# Patient Record
Sex: FEMALE | Race: WHITE | Employment: OTHER | URBAN - METROPOLITAN AREA
[De-identification: names, ages, dates, MRNs, and addresses within clinical notes are randomized per-mention and may not be internally consistent; named-entity substitution may affect disease eponyms.]

---

## 2017-02-07 ENCOUNTER — HOSPITAL ENCOUNTER (OUTPATIENT)
Dept: MAMMOGRAPHY | Age: 57
Discharge: HOME | End: 2017-02-07
Attending: INTERNAL MEDICINE
Payer: COMMERCIAL

## 2017-02-07 DIAGNOSIS — Z12.31 VISIT FOR SCREENING MAMMOGRAM: ICD-10-CM

## 2017-02-07 PROCEDURE — 77067 SCR MAMMO BI INCL CAD: CPT

## 2017-05-09 ENCOUNTER — TELEPHONE (OUTPATIENT)
Dept: OBGYN CLINIC | Facility: CLINIC | Age: 57
End: 2017-05-09

## 2017-05-09 ENCOUNTER — OFFICE VISIT (OUTPATIENT)
Dept: OBGYN CLINIC | Facility: CLINIC | Age: 57
End: 2017-05-09

## 2017-05-09 VITALS
DIASTOLIC BLOOD PRESSURE: 76 MMHG | SYSTOLIC BLOOD PRESSURE: 113 MMHG | HEIGHT: 65 IN | BODY MASS INDEX: 23.99 KG/M2 | WEIGHT: 144 LBS

## 2017-05-09 DIAGNOSIS — N95.0 POSTMENOPAUSAL BLEEDING: Primary | ICD-10-CM

## 2017-05-09 PROCEDURE — 99203 OFFICE O/P NEW LOW 30 MIN: CPT | Performed by: OBSTETRICS & GYNECOLOGY

## 2017-05-09 RX ORDER — ALPRAZOLAM 0.25 MG/1
TABLET ORAL
COMMUNITY
Start: 2016-06-27

## 2017-05-09 RX ORDER — ESCITALOPRAM OXALATE 10 MG/1
TABLET ORAL
Refills: 3 | COMMUNITY
Start: 2017-04-20

## 2017-05-09 RX ORDER — ATORVASTATIN CALCIUM 10 MG/1
TABLET, FILM COATED ORAL
Refills: 0 | COMMUNITY
Start: 2017-04-20

## 2017-05-09 NOTE — TELEPHONE ENCOUNTER
Dr. Bernarda Cooper requesting Centralized scheduling be called by an RN in the office to schedule pt's pelvic U/S. Centralized scheduling closed at this time.  Message left on Centralized scheduling's voicemail to call a RN at this office in order to schedule U/S

## 2017-05-09 NOTE — PROGRESS NOTES
HPI:   Burnetta Heimlich is a 56year old female who presents for a consult for postmenopausal bleeding for 7 days last week, none currently. Pt counseled on possible etiologies/possible workup/possible treatments with postmenopausal bleeding.         Wt Readi possible hysteroscopy/ d & c if lesion noted on u/s. Well woman counseling. Pt in exam room/seen and counseled for approx 20-25 min.

## 2017-05-10 ENCOUNTER — HOSPITAL ENCOUNTER (OUTPATIENT)
Dept: ULTRASOUND IMAGING | Age: 57
Discharge: HOME | End: 2017-05-10
Attending: OBSTETRICS & GYNECOLOGY
Payer: COMMERCIAL

## 2017-05-10 DIAGNOSIS — N95.0 POSTMENOPAUSAL BLEEDING: ICD-10-CM

## 2017-05-10 PROCEDURE — 76856 US EXAM PELVIC COMPLETE: CPT | Performed by: OBSTETRICS & GYNECOLOGY

## 2017-05-10 PROCEDURE — 76830 TRANSVAGINAL US NON-OB: CPT | Performed by: OBSTETRICS & GYNECOLOGY

## 2017-05-11 ENCOUNTER — TELEPHONE (OUTPATIENT)
Dept: OBGYN CLINIC | Facility: CLINIC | Age: 57
End: 2017-05-11

## 2017-05-12 NOTE — TELEPHONE ENCOUNTER
Pt counseled on prob submucosal fibroid,  rec hysterscopy/myosure myomectomy/d  & c;   Diagnosis postmenopausal bleeding;submucosal fibroid.  Pt requested week of may 21, 2017

## 2017-05-12 NOTE — TELEPHONE ENCOUNTER
Called pt,left message. Pt has submucosal fibroid,  Would recommend a Operative Hysteroscopy/ Myosure/d & c.   Please let me know when pt calls back.

## 2017-05-12 NOTE — TELEPHONE ENCOUNTER
Pt called and was informed that, per Dr Ryan Kaminski , she has a submucosal fibroid and that he recommends she have a operative hysteroscopy/Myosure/and D+C. Pt at work, but will have her cell phone on her all day. Requesting Dr Ryan Kaminski call and discuss procedure with her.

## 2017-05-15 NOTE — TELEPHONE ENCOUNTER
Pt returning call also calling in [facility] to some other results she would like faxed over to [phone] ( ultrasound results)

## 2017-05-15 NOTE — TELEPHONE ENCOUNTER
Patient is scheduled for [facility] May 24th @ 10:30. Form faxed to surgery and entered in the book. Informed Gabriela Fitzgerald at surgery scheduling to call a rep for the Myosure. She agreed. Called patient to inform of details, no answer. LVM to call us back-need to give her codes of surgery for coverage (PPO).

## 2017-05-15 NOTE — TELEPHONE ENCOUNTER
Spoke to patient and informed of surgery date and time (2 hours prior). She Understood and verbalized agreement  Also, informed her to contact her [employer]-PP[employer] to check coverage of procedure; [id number]. She wanted those codes sent to her my chart. Sent her an e-mail to sign up for my chart to receive communications. Printed a minor case letter for now, and mailed it out today with surgery details. She wanted a copy of her U/S as well. I transferred her to [facility] to release those to her.

## 2017-05-16 NOTE — TELEPHONE ENCOUNTER
Updated book and called surgery scheduling to inform of surgery cancellation. Patient was seeking a second opinion.

## 2018-09-15 ENCOUNTER — APPOINTMENT (OUTPATIENT)
Dept: GENERAL RADIOLOGY | Facility: HOSPITAL | Age: 58
End: 2018-09-15
Attending: EMERGENCY MEDICINE
Payer: COMMERCIAL

## 2018-09-15 ENCOUNTER — HOSPITAL ENCOUNTER (OUTPATIENT)
Facility: HOSPITAL | Age: 58
Setting detail: OBSERVATION
Discharge: HOME | End: 2018-09-16
Attending: EMERGENCY MEDICINE | Admitting: HOSPITALIST
Payer: COMMERCIAL

## 2018-09-15 DIAGNOSIS — R07.9 CHEST PAIN, CARDIAC: Primary | ICD-10-CM

## 2018-09-15 LAB
ALBUMIN SERPL-MCNC: 3.7 G/DL (ref 3.5–4.8)
ALBUMIN/GLOB SERPL: 1.2 {RATIO} (ref 1–2)
ALP LIVER SERPL-CCNC: 60 U/L (ref 46–118)
ALT SERPL-CCNC: 31 U/L (ref 14–54)
ANION GAP SERPL CALC-SCNC: 8 MMOL/L (ref 0–18)
AST SERPL-CCNC: 46 U/L (ref 15–41)
BASOPHILS # BLD AUTO: 0.02 X10(3) UL (ref 0–0.1)
BASOPHILS NFR BLD AUTO: 0.3 %
BILIRUB SERPL-MCNC: 0.6 MG/DL (ref 0.1–2)
BUN BLD-MCNC: 18 MG/DL (ref 8–20)
BUN/CREAT SERPL: 15.3 (ref 10–20)
CALCIUM BLD-MCNC: 8.8 MG/DL (ref 8.3–10.3)
CHLORIDE SERPL-SCNC: 105 MMOL/L (ref 101–111)
CO2 SERPL-SCNC: 24 MMOL/L (ref 22–32)
CREAT BLD-MCNC: 1.18 MG/DL (ref 0.55–1.02)
D-DIMER: 0.27 UG/ML FEU (ref 0–0.49)
EOSINOPHIL # BLD AUTO: 0.02 X10(3) UL (ref 0–0.3)
EOSINOPHIL NFR BLD AUTO: 0.3 %
ERYTHROCYTE [DISTWIDTH] IN BLOOD BY AUTOMATED COUNT: 13.3 % (ref 11.5–16)
GLOBULIN PLAS-MCNC: 3.2 G/DL (ref 2.5–4)
GLUCOSE BLD-MCNC: 105 MG/DL (ref 70–99)
HCT VFR BLD AUTO: 37.9 % (ref 34–50)
HGB BLD-MCNC: 12.5 G/DL (ref 12–16)
IMMATURE GRANULOCYTE COUNT: 0.01 X10(3) UL (ref 0–1)
IMMATURE GRANULOCYTE RATIO %: 0.1 %
LYMPHOCYTES # BLD AUTO: 1.64 X10(3) UL (ref 0.9–4)
LYMPHOCYTES NFR BLD AUTO: 22.8 %
M PROTEIN MFR SERPL ELPH: 6.9 G/DL (ref 6.1–8.3)
MCH RBC QN AUTO: 30.2 PG (ref 27–33.2)
MCHC RBC AUTO-ENTMCNC: 33 G/DL (ref 31–37)
MCV RBC AUTO: 91.5 FL (ref 81–100)
MONOCYTES # BLD AUTO: 0.38 X10(3) UL (ref 0.1–1)
MONOCYTES NFR BLD AUTO: 5.3 %
NEUTROPHIL ABS PRELIM: 5.12 X10 (3) UL (ref 1.3–6.7)
NEUTROPHILS # BLD AUTO: 5.12 X10(3) UL (ref 1.3–6.7)
NEUTROPHILS NFR BLD AUTO: 71.2 %
OSMOLALITY SERPL CALC.SUM OF ELEC: 286 MOSM/KG (ref 275–295)
PLATELET # BLD AUTO: 207 10(3)UL (ref 150–450)
POTASSIUM SERPL-SCNC: 5.3 MMOL/L (ref 3.6–5.1)
RBC # BLD AUTO: 4.14 X10(6)UL (ref 3.8–5.1)
RED CELL DISTRIBUTION WIDTH-SD: 44.8 FL (ref 35.1–46.3)
SODIUM SERPL-SCNC: 137 MMOL/L (ref 136–144)
TROPONIN I SERPL-MCNC: <0.046 NG/ML (ref ?–0.05)
TROPONIN I SERPL-MCNC: <0.046 NG/ML (ref ?–0.05)
WBC # BLD AUTO: 7.2 X10(3) UL (ref 4–13)

## 2018-09-15 PROCEDURE — 99220 INITIAL OBSERVATION CARE,LEVL III: CPT | Performed by: INTERNAL MEDICINE

## 2018-09-15 PROCEDURE — 71045 X-RAY EXAM CHEST 1 VIEW: CPT | Performed by: EMERGENCY MEDICINE

## 2018-09-15 RX ORDER — HEPARIN SODIUM 5000 [USP'U]/ML
5000 INJECTION, SOLUTION INTRAVENOUS; SUBCUTANEOUS EVERY 8 HOURS SCHEDULED
Status: DISCONTINUED | OUTPATIENT
Start: 2018-09-15 | End: 2018-09-16

## 2018-09-15 RX ORDER — ASPIRIN 325 MG
325 TABLET ORAL DAILY
Status: DISCONTINUED | OUTPATIENT
Start: 2018-09-16 | End: 2018-09-16

## 2018-09-15 RX ORDER — NITROGLYCERIN 0.4 MG/1
0.4 TABLET SUBLINGUAL EVERY 5 MIN PRN
Status: DISCONTINUED | OUTPATIENT
Start: 2018-09-15 | End: 2018-09-16

## 2018-09-15 RX ORDER — ATORVASTATIN CALCIUM 10 MG/1
10 TABLET, FILM COATED ORAL NIGHTLY
Status: DISCONTINUED | OUTPATIENT
Start: 2018-09-15 | End: 2018-09-16

## 2018-09-15 RX ORDER — ALPRAZOLAM 0.25 MG/1
0.25 TABLET ORAL 2 TIMES DAILY PRN
Status: DISCONTINUED | OUTPATIENT
Start: 2018-09-15 | End: 2018-09-16

## 2018-09-15 RX ORDER — ASPIRIN 81 MG/1
324 TABLET, CHEWABLE ORAL ONCE
Status: COMPLETED | OUTPATIENT
Start: 2018-09-15 | End: 2018-09-15

## 2018-09-15 RX ORDER — ONDANSETRON 2 MG/ML
4 INJECTION INTRAMUSCULAR; INTRAVENOUS EVERY 6 HOURS PRN
Status: DISCONTINUED | OUTPATIENT
Start: 2018-09-15 | End: 2018-09-16

## 2018-09-15 RX ORDER — METOCLOPRAMIDE HYDROCHLORIDE 5 MG/ML
10 INJECTION INTRAMUSCULAR; INTRAVENOUS EVERY 8 HOURS PRN
Status: DISCONTINUED | OUTPATIENT
Start: 2018-09-15 | End: 2018-09-16

## 2018-09-15 RX ORDER — ESCITALOPRAM OXALATE 10 MG/1
10 TABLET ORAL EVERY MORNING
Status: DISCONTINUED | OUTPATIENT
Start: 2018-09-16 | End: 2018-09-16

## 2018-09-15 RX ORDER — ACETAMINOPHEN 325 MG/1
650 TABLET ORAL EVERY 6 HOURS PRN
Status: DISCONTINUED | OUTPATIENT
Start: 2018-09-15 | End: 2018-09-16

## 2018-09-15 NOTE — ED INITIAL ASSESSMENT (HPI)
Pt stated she was biking 25 miles today and while riding started to get chest pain. Pt stopped bike riding and the pain went away within about 5 minutes. Pt c/o tingling in left arm. Pt denies any chest pain at this moment.  Pt states its hard to take a agapito

## 2018-09-15 NOTE — ED PROVIDER NOTES
Patient Seen in: [facility] Emergency Department    History   Patient presents with:  Chest Pain Angina (cardiovascular)    Stated Complaint: chest pain    HPI    Patient is a 82-year-old female comes into emergency room for evaluation of chest tigh Resp 13   Ht 165.1 cm (5' 5\")   Wt 65.2 kg   SpO2 100%   BMI 23.92 kg/m²         Physical Exam    GENERAL: No acute distress, well appearing and non-toxic, Alert and oriented X 3   HEENT: Normocephalic, atraumatic. Moist mucous membranes.   Pupils equal CBC W/ DIFFERENTIAL[[id number]]                              Final result                 Please view results for these tests on the individual orders.    TROPONIN I   RAINBOW DRAW BLUE   RAINBOW DRAW LAVENDER   RAINBOW DRAW LIGHT GREEN   RAINBOW DRAW GOL cardiac  (primary encounter diagnosis)    Disposition:  Admit  9/15/2018  6:19 pm    Follow-up:  No follow-up provider specified.       Medications Prescribed:  Current Discharge Medication List        Present on Admission           ICD-10-CM Noted POA    *

## 2018-09-16 ENCOUNTER — PRIOR ORIGINAL RECORDS (OUTPATIENT)
Dept: OTHER | Age: 58
End: 2018-09-16

## 2018-09-16 ENCOUNTER — APPOINTMENT (OUTPATIENT)
Dept: CV DIAGNOSTICS | Facility: HOSPITAL | Age: 58
End: 2018-09-16
Attending: INTERNAL MEDICINE
Payer: COMMERCIAL

## 2018-09-16 VITALS
HEIGHT: 65 IN | TEMPERATURE: 98 F | SYSTOLIC BLOOD PRESSURE: 101 MMHG | HEART RATE: 70 BPM | OXYGEN SATURATION: 98 % | WEIGHT: 143.75 LBS | DIASTOLIC BLOOD PRESSURE: 53 MMHG | RESPIRATION RATE: 18 BRPM | BODY MASS INDEX: 23.95 KG/M2

## 2018-09-16 LAB
ANION GAP SERPL CALC-SCNC: 6 MMOL/L (ref 0–18)
ATRIAL RATE: 61 BPM
ATRIAL RATE: 84 BPM
BUN BLD-MCNC: 15 MG/DL (ref 8–20)
BUN/CREAT SERPL: 17.4 (ref 10–20)
CALCIUM BLD-MCNC: 9 MG/DL (ref 8.3–10.3)
CHLORIDE SERPL-SCNC: 107 MMOL/L (ref 101–111)
CHOLEST SMN-MCNC: 217 MG/DL (ref ?–200)
CO2 SERPL-SCNC: 29 MMOL/L (ref 22–32)
CREAT BLD-MCNC: 0.86 MG/DL (ref 0.55–1.02)
EST. AVERAGE GLUCOSE BLD GHB EST-MCNC: 111 MG/DL (ref 68–126)
GLUCOSE BLD-MCNC: 88 MG/DL (ref 70–99)
HBA1C MFR BLD HPLC: 5.5 % (ref ?–5.7)
HDLC SERPL-MCNC: 89 MG/DL (ref 40–59)
LDLC SERPL CALC-MCNC: 110 MG/DL (ref ?–100)
NONHDLC SERPL-MCNC: 128 MG/DL (ref ?–130)
OSMOLALITY SERPL CALC.SUM OF ELEC: 294 MOSM/KG (ref 275–295)
P AXIS: 61 DEGREES
P AXIS: 68 DEGREES
P-R INTERVAL: 158 MS
P-R INTERVAL: 164 MS
POTASSIUM SERPL-SCNC: 4.1 MMOL/L (ref 3.6–5.1)
Q-T INTERVAL: 370 MS
Q-T INTERVAL: 396 MS
QRS DURATION: 88 MS
QRS DURATION: 90 MS
QTC CALCULATION (BEZET): 398 MS
QTC CALCULATION (BEZET): 437 MS
R AXIS: 20 DEGREES
R AXIS: 4 DEGREES
SODIUM SERPL-SCNC: 142 MMOL/L (ref 136–144)
T AXIS: 34 DEGREES
T AXIS: 39 DEGREES
TRIGL SERPL-MCNC: 91 MG/DL (ref 30–149)
TROPONIN I SERPL-MCNC: <0.046 NG/ML (ref ?–0.05)
VENTRICULAR RATE: 61 BPM
VENTRICULAR RATE: 84 BPM
VLDLC SERPL CALC-MCNC: 18 MG/DL (ref 0–30)

## 2018-09-16 PROCEDURE — 99217 OBSERVATION CARE DISCHARGE: CPT | Performed by: STUDENT IN AN ORGANIZED HEALTH CARE EDUCATION/TRAINING PROGRAM

## 2018-09-16 PROCEDURE — 93306 TTE W/DOPPLER COMPLETE: CPT | Performed by: INTERNAL MEDICINE

## 2018-09-16 RX ORDER — ASPIRIN 81 MG/1
81 TABLET ORAL DAILY
Qty: 30 TABLET | Refills: 0 | Status: SHIPPED | COMMUNITY
Start: 2018-09-16

## 2018-09-16 NOTE — DISCHARGE SUMMARY
[facility] HOSPITAL[facility]  DISCHARGE SUMMARY     Melissa Sprague Patient Status:  Observation    1960 MRN [id number]   [facility] 8NE-A Attending Sarah Anderson MD   Hosp Day # 0 PCP Rubén      Date of Admission: 9/15/2018  Date of D CONTINUE taking these medications      Instructions Prescription details   ALPRAZolam 0.25 MG Tabs  Commonly known as:  XANAX       Refills:  0     atorvastatin 10 MG Tabs  Commonly known as:  LIPITOR      TK 1 T PO QD   Refills:  0     D 5000 5000 units

## 2018-09-16 NOTE — H&P
[facility] HOSPITALIST  History and Physical     Bro[place] Shade Patient Status:  Observation    1960 MRN [id number]   [facility] 8NE-A Attending Gay Heredia,    Hosp Day # 0 PCP Thania Abdalla     Chief Complaint: chest pain    His bruits. Respiratory: Clear to auscultation bilaterally. No wheezes. No rhonchi. Cardiovascular: S1, S2. Regular rate and rhythm. No murmurs, rubs or gallops. Equal pulses. Chest and Back: No tenderness or deformity.   Abdomen: Soft, nontender, nondisten

## 2018-09-16 NOTE — PLAN OF CARE
CARDIOVASCULAR - ADULT    • Maintains optimal cardiac output and hemodynamic stability Progressing    • Absence of cardiac arrhythmias or at baseline Progressing            Pt alert able to make needs known   Denies chest pain   Pt scheduled for discharge

## 2018-09-16 NOTE — PROGRESS NOTES
NURSING DISCHARGE NOTE    Discharged to home  via private car  Accompanied by spouse  Belongings taken with. Pt discharged to home. Discharge instructions reviewed with pt and spouse verbalized understanding. Script send with.  IV catheter lula

## 2018-09-16 NOTE — CONSULTS
[facility]  Cardiology Consultation    Jaleel Zhou Patient Status:  Emergency    1960 MRN [id number]   Location [address] [facility] Attending Aramis Mcknight MD   Hosp Day # 0 PCP Walters Daily     Reason for Consultat murmur, pericardial rub, S3.  Lungs: Clear without wheezes, rales, rhonchi or dullness. Normal excursions and effort. Abdomen: Soft, non-tender. Extremities: Without clubbing, cyanosis or edema. Peripheral pulses are 2+.   Neurologic: Alert and oriente

## 2018-09-17 ENCOUNTER — PRIOR ORIGINAL RECORDS (OUTPATIENT)
Dept: OTHER | Age: 58
End: 2018-09-17

## 2018-09-17 RX ORDER — ALPRAZOLAM 0.25 MG/1
0.25 TABLET ORAL
COMMUNITY

## 2018-09-18 ENCOUNTER — HOSPITAL ENCOUNTER (OUTPATIENT)
Dept: CT IMAGING | Facility: HOSPITAL | Age: 58
Discharge: HOME | End: 2018-09-18
Attending: INTERNAL MEDICINE
Payer: COMMERCIAL

## 2018-09-18 VITALS — SYSTOLIC BLOOD PRESSURE: 99 MMHG | DIASTOLIC BLOOD PRESSURE: 69 MMHG | HEART RATE: 60 BPM | RESPIRATION RATE: 13 BRPM

## 2018-09-18 DIAGNOSIS — R07.9 CHEST PAIN, UNSPECIFIED: ICD-10-CM

## 2018-09-18 PROCEDURE — 75574 CT ANGIO HRT W/3D IMAGE: CPT | Performed by: INTERNAL MEDICINE

## 2018-09-18 RX ORDER — DILTIAZEM HYDROCHLORIDE 5 MG/ML
INJECTION INTRAVENOUS
Status: COMPLETED
Start: 2018-09-18 | End: 2018-09-18

## 2018-09-18 RX ORDER — METOPROLOL TARTRATE 5 MG/5ML
INJECTION INTRAVENOUS
Status: COMPLETED
Start: 2018-09-18 | End: 2018-09-18

## 2018-09-18 RX ORDER — NITROGLYCERIN 0.4 MG/1
TABLET SUBLINGUAL
Status: COMPLETED
Start: 2018-09-18 | End: 2018-09-18

## 2018-09-18 RX ADMIN — METOPROLOL TARTRATE 5 MG: 5 INJECTION INTRAVENOUS at 10:15:00

## 2018-09-18 RX ADMIN — NITROGLYCERIN 0.4 MG: 0.4 TABLET SUBLINGUAL at 10:38:00

## 2018-09-18 RX ADMIN — DILTIAZEM HYDROCHLORIDE 10 MG: 5 INJECTION INTRAVENOUS at 10:35:00

## 2018-09-18 RX ADMIN — METOPROLOL TARTRATE 5 MG: 5 INJECTION INTRAVENOUS at 10:20:00

## 2018-09-19 ENCOUNTER — MYAURORA ACCOUNT LINK (OUTPATIENT)
Dept: OTHER | Age: 58
End: 2018-09-19

## 2018-09-19 ENCOUNTER — PRIOR ORIGINAL RECORDS (OUTPATIENT)
Dept: OTHER | Age: 58
End: 2018-09-19

## 2018-09-26 LAB
BUN: 15 MG/DL
CALCIUM: 9 MG/DL
CHLORIDE: 107 MEQ/L
CHOLESTEROL, TOTAL: 217 MG/DL
CREATININE, SERUM: 0.86 MG/DL
GLUCOSE: 88 MG/DL
HDL CHOLESTEROL: 89 MG/DL
HEMATOCRIT: 37.9 %
HEMOGLOBIN A1C: 5.5 %
HEMOGLOBIN: 12.5 G/DL
LDL CHOLESTEROL: 110 MG/DL
PLATELETS: 207 K/UL
POTASSIUM, SERUM: 4.1 MEQ/L
RED BLOOD COUNT: 4.14 X 10-6/U
SODIUM: 142 MEQ/L
TRIGLYCERIDES: 91 MG/DL
WHITE BLOOD COUNT: 7.2 X 10-3/U

## 2019-02-28 VITALS
WEIGHT: 141 LBS | SYSTOLIC BLOOD PRESSURE: 118 MMHG | DIASTOLIC BLOOD PRESSURE: 70 MMHG | HEART RATE: 64 BPM | HEIGHT: 65 IN | BODY MASS INDEX: 23.49 KG/M2

## 2024-12-24 NOTE — ED PROVIDER NOTES
Patient Seen in: [facility] Emergency Department    History     Chief Complaint   Patient presents with    Numbness Weakness       HPI    History is provided by patient/independent historian: patient, patient's significant other  64 year old female with history of cervical radiculopathy here with complaints of left sided facial numbness that she noticed at 5 AM this morning.  She went to bed normally at 11 PM.  Since then, she called her doctor to see if her  neck problem was causing her symptoms and was referred to the emergency department for further evaluation.  Patient significant other reports left leg tingling without any weakness.  She has chronic left arm numbness for the last few months which is unrelated she believes.  Her speech is normal.  No dysarthria.  No facial asymmetry.    History reviewed. History reviewed. No pertinent past medical history.      History reviewed.   Past Surgical History:   Procedure Laterality Date    Breast biopsy Right 1987         Home Medications reviewed :  Prescriptions Prior to Admission[1]      History reviewed.   Social History     Socioeconomic History    Marital status: Single   Tobacco Use    Smoking status: Never    Smokeless tobacco: Never   Substance and Sexual Activity    Alcohol use: Yes     Alcohol/week: 3.0 standard drinks of alcohol     Types: 3 Standard drinks or equivalent per week     Comment: per week    Drug use: No         ROS  Review of Systems   Respiratory:  Negative for shortness of breath.    Cardiovascular:  Negative for chest pain.   Neurological:  Positive for numbness.   All other systems reviewed and are negative.     All other pertinent organ systems are reviewed and are negative.      Physical Exam     ED Triage Vitals   BP 12/23/24 2037 146/84   Pulse 12/23/24 2037 76   Resp 12/23/24 2037 16   Temp 12/23/24 2037 98.4 °F (36.9 °C)   Temp src 12/23/24 2037 Oral   SpO2 12/23/24 2037 100 %   O2 Device 12/23/24 1038 None (Room air)      Vital signs reviewed.      Physical Exam  Vitals and nursing note reviewed.   Cardiovascular:      Pulses: Normal pulses.   Pulmonary:      Effort: No respiratory distress.   Abdominal:      General: There is no distension.   Neurological:      Mental Status: She is alert.      Comments: 5/5 strength in b/l UEs and LEs, normal sensation in all extremities, normal finger to nose b/l, normal heel to shin b/l, no pronator drift,  no facial asymmetry, normal speech           ED Course       Labs:     Labs Reviewed   COMP METABOLIC PANEL (14) - Abnormal; Notable for the following components:       Result Value    Creatinine 1.18 (*)     eGFR-Cr 52 (*)     AST 39 (*)     A/G Ratio 2.1 (*)     All other components within normal limits   CBC WITH DIFFERENTIAL WITH PLATELET   RAINBOW DRAW LAVENDER   RAINBOW DRAW LIGHT GREEN   RAINBOW DRAW BLUE   RAINBOW DRAW GOLD         My EKG Interpretation: EKG    Rate, intervals and axes as noted on EKG Report.  Rate: 64  Rhythm: Sinus Rhythm  Reading: normal for rate, normal for rhythm, no acute ST changes           As reviewed and Interpreted by me      Imaging Results Available and Reviewed while in ED:   MRI BRAIN WO ACUTE (3) SEQUENCE (CPT=70551)    Result Date: 12/23/2024  CONCLUSION:   Fast diffusion protocol with limited evaluation of the brain parenchyma.  No acute intracranial abnormality.   Partial opacification of the bilateral mastoid air cells may be sequela of inflammation. No osseous destruction or bony erosions.   Dictated by (CST): Devon Curran MD on 12/23/2024 at 10:01 PM     Finalized by (CST): Devon Curran MD on 12/23/2024 at 10:03 PM         My review and independent interpretation of MRI images: no ICH. Radiology report corroborates this in addition to other details as reported by them.      Decision rules/scores evaluated: none      Diagnostic labs/tests considered but not ordered: none    ED Medications Administered: Medications - No data to display              MDM       Medical Decision Making      Differential Diagnosis: After obtaining the patient's history, performing the physical exam and reviewing the diagnostics, multiple initial diagnoses were considered based on the presenting problem including CVA, atypical migraine, bells palsy, neuropathy    External document review: I personally reviewed available external medical records for any recent pertinent discharge summaries, testing, and procedures - the findings are as follows: 2/2/24 visi with Dr. Conner for spine surgery hx    Complicating Factors: The patient already  has no past medical history on file. to contribute to the complexity of this ED evaluation.    Procedures performed: none    Discussed management with physician/appropriate source: none    Considered admission/deescalation of care for: none    Social determinants of health affecting patient care: none    Prescription medications considered: discussed continuing current medication regimen    The patient requires continuous monitoring for: L sided numbness    Shared decision making: discussed possible admission      Disposition and Plan     Clinical Impression:  1. Left facial numbness        Disposition:  Discharge    Follow-up:  your neurologist    Follow up        Medications Prescribed:  Current Discharge Medication List                         [1] (Not in a hospital admission)

## 2024-12-24 NOTE — ED INITIAL ASSESSMENT (HPI)
Left side facial numbness yesterday unsure what time, states she only really started to noticed it this morning when she noticed it originally.+pain to the left side of the neck since this morning, denies otc medicine.   States she also has some numbness  down her left arm which has been intermittent for the last year and has been getting frequent mris and scans for it because her spinal surgeon thinks it is related to her previous spinal fusions.

## (undated) NOTE — Clinical Note
MINOR CASE LETTER      5/15/2017        Dear Dileep Lawrence,    Your are having a Hysterscopy/Myomectomy, and D&C on May 24th at 10:30 am.    PLEASE NOTE: to arrive 2 hours prior for registration and preparation of surgery 8:30 am    Do not eat or drink anything (including water) after midnight the night before surgery. If your procedure is scheduled later in the day, then nothing by mouth for 6 hours before arrival to the hospital.    Janak Doe are to call this office if you have any cold or flu symptoms 2 days before your scheduled surgery. Please avoid aspirin 7 days before surgery. Avoid Ibuprofen, Motrin, Aleve, or Naprosyn for 3 days before surgery. You will be contacted by PreAdmission Testing (PAT) usually within the week before surgery. They will take a short medical history and let you know if any preoperative testing is needed. Please review and follow the attached Preoperative Antimicrobial Wash/Bath Instructions. Contact your insurance company to let them know you are having a Hysterscopy, Myomectomy, and dilation and curettage done for proper coverage  . Codes: [id number], [id number]    Call our office if any pre-certification or pre-authorization is required. If you have an HMO or EPO insurance, we will initiate the referral for you. Please make arrangements for someone to drive you home after your surgery. Please feel free to contact our office at  if you have any questions regarding these instructions or your procedure. Sincerely,          Deshaun Boone.  Elly Neal MD  [facility], [address] [place] [phone]

## (undated) NOTE — MR AVS SNAPSHOT
[facility]  [address] [place] [address] [phone]               Thank you for choosing us for your health care visit with Abdirahman Vernon.  Jaja Islas MD.  We are glad to serve you and happy to provide you with this summary of your vis visit,  view other health information, and more. To sign up or find more information, go to [email or web address] and click on the Sign Up Now link in the [employer] box.      Enter your [employer] Display System Activation Code exactly as it appears below along with yo